# Patient Record
Sex: MALE | Race: WHITE | NOT HISPANIC OR LATINO | URBAN - METROPOLITAN AREA
[De-identification: names, ages, dates, MRNs, and addresses within clinical notes are randomized per-mention and may not be internally consistent; named-entity substitution may affect disease eponyms.]

---

## 2019-06-17 ENCOUNTER — INPATIENT (INPATIENT)
Facility: HOSPITAL | Age: 59
LOS: 0 days | Discharge: HOME | DRG: 303 | End: 2019-06-18
Attending: INTERNAL MEDICINE | Admitting: INTERNAL MEDICINE
Payer: COMMERCIAL

## 2019-06-17 VITALS
SYSTOLIC BLOOD PRESSURE: 119 MMHG | TEMPERATURE: 98 F | OXYGEN SATURATION: 97 % | HEART RATE: 85 BPM | RESPIRATION RATE: 18 BRPM | DIASTOLIC BLOOD PRESSURE: 66 MMHG | WEIGHT: 184.97 LBS

## 2019-06-17 DIAGNOSIS — Z87.81 PERSONAL HISTORY OF (HEALED) TRAUMATIC FRACTURE: Chronic | ICD-10-CM

## 2019-06-17 DIAGNOSIS — E78.00 PURE HYPERCHOLESTEROLEMIA, UNSPECIFIED: ICD-10-CM

## 2019-06-17 DIAGNOSIS — I20.0 UNSTABLE ANGINA: ICD-10-CM

## 2019-06-17 LAB
ALBUMIN SERPL ELPH-MCNC: 4.3 G/DL — SIGNIFICANT CHANGE UP (ref 3.3–5)
ALP SERPL-CCNC: 41 U/L — SIGNIFICANT CHANGE UP (ref 40–120)
ALT FLD-CCNC: 24 U/L — SIGNIFICANT CHANGE UP (ref 10–45)
ANION GAP SERPL CALC-SCNC: 9 MMOL/L — SIGNIFICANT CHANGE UP (ref 5–17)
AST SERPL-CCNC: 21 U/L — SIGNIFICANT CHANGE UP (ref 10–40)
BILIRUB SERPL-MCNC: 0.4 MG/DL — SIGNIFICANT CHANGE UP (ref 0.2–1.2)
BUN SERPL-MCNC: 19 MG/DL — SIGNIFICANT CHANGE UP (ref 7–23)
CALCIUM SERPL-MCNC: 9 MG/DL — SIGNIFICANT CHANGE UP (ref 8.4–10.5)
CHLORIDE SERPL-SCNC: 107 MMOL/L — SIGNIFICANT CHANGE UP (ref 96–108)
CO2 SERPL-SCNC: 24 MMOL/L — SIGNIFICANT CHANGE UP (ref 22–31)
CREAT SERPL-MCNC: 1 MG/DL — SIGNIFICANT CHANGE UP (ref 0.5–1.3)
GLUCOSE SERPL-MCNC: 106 MG/DL — HIGH (ref 70–99)
HCT VFR BLD CALC: 37.1 % — LOW (ref 39–50)
HGB BLD-MCNC: 12.5 G/DL — LOW (ref 13–17)
MCHC RBC-ENTMCNC: 30.8 PG — SIGNIFICANT CHANGE UP (ref 27–34)
MCHC RBC-ENTMCNC: 33.7 GM/DL — SIGNIFICANT CHANGE UP (ref 32–36)
MCV RBC AUTO: 91.4 FL — SIGNIFICANT CHANGE UP (ref 80–100)
NRBC # BLD: 0 /100 WBCS — SIGNIFICANT CHANGE UP (ref 0–0)
NT-PROBNP SERPL-SCNC: 221 PG/ML — SIGNIFICANT CHANGE UP (ref 0–300)
PLATELET # BLD AUTO: 241 K/UL — SIGNIFICANT CHANGE UP (ref 150–400)
POTASSIUM SERPL-MCNC: 4.3 MMOL/L — SIGNIFICANT CHANGE UP (ref 3.5–5.3)
POTASSIUM SERPL-SCNC: 4.3 MMOL/L — SIGNIFICANT CHANGE UP (ref 3.5–5.3)
PROT SERPL-MCNC: 7.1 G/DL — SIGNIFICANT CHANGE UP (ref 6–8.3)
RBC # BLD: 4.06 M/UL — LOW (ref 4.2–5.8)
RBC # FLD: 12.3 % — SIGNIFICANT CHANGE UP (ref 10.3–14.5)
SODIUM SERPL-SCNC: 140 MMOL/L — SIGNIFICANT CHANGE UP (ref 135–145)
TROPONIN T SERPL-MCNC: <0.01 NG/ML — SIGNIFICANT CHANGE UP (ref 0–0.01)
WBC # BLD: 7.17 K/UL — SIGNIFICANT CHANGE UP (ref 3.8–10.5)
WBC # FLD AUTO: 7.17 K/UL — SIGNIFICANT CHANGE UP (ref 3.8–10.5)

## 2019-06-17 PROCEDURE — 71046 X-RAY EXAM CHEST 2 VIEWS: CPT | Mod: 26

## 2019-06-17 PROCEDURE — 93010 ELECTROCARDIOGRAM REPORT: CPT

## 2019-06-17 PROCEDURE — 99285 EMERGENCY DEPT VISIT HI MDM: CPT | Mod: 25

## 2019-06-17 RX ORDER — ASPIRIN/CALCIUM CARB/MAGNESIUM 324 MG
1 TABLET ORAL
Qty: 0 | Refills: 0 | DISCHARGE

## 2019-06-17 RX ORDER — ATORVASTATIN CALCIUM 80 MG/1
40 TABLET, FILM COATED ORAL AT BEDTIME
Refills: 0 | Status: DISCONTINUED | OUTPATIENT
Start: 2019-06-17 | End: 2019-06-18

## 2019-06-17 RX ORDER — ROSUVASTATIN CALCIUM 5 MG/1
1 TABLET ORAL
Qty: 0 | Refills: 0 | DISCHARGE

## 2019-06-17 RX ORDER — ASPIRIN/CALCIUM CARB/MAGNESIUM 324 MG
325 TABLET ORAL ONCE
Refills: 0 | Status: COMPLETED | OUTPATIENT
Start: 2019-06-17 | End: 2019-06-17

## 2019-06-17 RX ORDER — ASPIRIN/CALCIUM CARB/MAGNESIUM 324 MG
81 TABLET ORAL DAILY
Refills: 0 | Status: DISCONTINUED | OUTPATIENT
Start: 2019-06-18 | End: 2019-06-18

## 2019-06-17 RX ORDER — ONDANSETRON 8 MG/1
4 TABLET, FILM COATED ORAL ONCE
Refills: 0 | Status: COMPLETED | OUTPATIENT
Start: 2019-06-17 | End: 2019-06-17

## 2019-06-17 RX ADMIN — ATORVASTATIN CALCIUM 40 MILLIGRAM(S): 80 TABLET, FILM COATED ORAL at 22:43

## 2019-06-17 RX ADMIN — ONDANSETRON 4 MILLIGRAM(S): 8 TABLET, FILM COATED ORAL at 19:43

## 2019-06-17 RX ADMIN — Medication 325 MILLIGRAM(S): at 19:43

## 2019-06-17 NOTE — ED PROVIDER NOTE - OBJECTIVE STATEMENT
59 y/o M w/hx ventricular ectopy (known, being watched by EP), CAD w/known 30% LAD lesion (no intervention thus far), referred to ED by Cardiologist Dr. Kelly Ramírez after a stress test today, which was unremarkable, but immediately afterward pt developed nausea lasting several hours. No vomiting. No CP or palpitations. States he was SOB toward the end of his treadmill stress test, but subsided with rest. No LE pain/swelling. No fever/chills/cough or recent illness. Referred for ACS r/o, concerned for nausea as anginal equivalent.

## 2019-06-17 NOTE — H&P ADULT - NSHPREVIEWOFSYSTEMS_GEN_ALL_CORE
GENERAL, CONSTITUTIONAL : denies recent weight loss, fever, chills  EYES, VISION: denies changes in vision   EARS, NOSE, THROAT: denies hearing loss  HEART, CARDIOVASCULAR: + chest pain,  SOB; denies arrhythmia, palpitations,  LE edema, claudication  RESPIRATORY: Denies cough, wheezing, PND, orthopnea  GASTROINTESTINAL: + SOB,; Denies abdominal pain, heartburn, bloody stool, dark tarry stool  GENITOURINARY: Denies frequent urination, urgency  MUSCULOSKELETAL denies joint pain or swelling, restricted motion, musculoskeletal pain.   SKIN & INTEGUMENTARY Denies rashes, sores, blisters, blisters, growths.  NEUROLOGICAL: Denies numbness or tingling sensations, sensation loss, burning.   PSYCHIATRIC: Denies nervousness, anxiety, depression  ENDOCRINE Denies heat or cold intolerance, excessive thirst  HEMATOLOGIC/LYMPHATIC: Denies abnormal bleeding, bleeding of any kind

## 2019-06-17 NOTE — ED PROVIDER NOTE - PHYSICAL EXAMINATION
VITAL SIGNS: I have reviewed nursing notes and confirm.  CONSTITUTIONAL: Well-developed; well-nourished; in no acute distress.  SKIN: Skin exam is warm and dry, no acute rash.  HEAD: Normocephalic; atraumatic.  EYES: PERRL, EOM intact; conjunctiva and sclera clear.  ENT: No nasal discharge; airway clear.  NECK: Supple; non tender.  CARD: S1, S2 normal; no murmurs, gallops, or rubs. Regular rate and rhythm.  RESP: No wheezes, rales or rhonchi.  ABD: Normal bowel sounds; soft; non-distended; non-tender; no hepatosplenomegaly.  EXT: Normal ROM. No clubbing, cyanosis or edema.  LYMPH: No acute cervical adenopathy.  NEURO: Alert, oriented. Grossly unremarkable.  PSYCH: Cooperative, appropriate.

## 2019-06-17 NOTE — ED ADULT NURSE NOTE - CHPI ED NUR SYMPTOMS NEG
no shortness of breath/no syncope/no chills/no chest pain/no vomiting/no congestion/no diaphoresis/no back pain/no fever

## 2019-06-17 NOTE — ED ADULT NURSE NOTE - NSIMPLEMENTINTERV_GEN_ALL_ED
Implemented All Universal Safety Interventions:  [place] to call system. Call bell, personal items and telephone within reach. Instruct patient to call for assistance. Room bathroom lighting operational. Non-slip footwear when patient is off stretcher. Physically safe environment: no spills, clutter or unnecessary equipment. Stretcher in lowest position, wheels locked, appropriate side rails in place.

## 2019-06-17 NOTE — PATIENT PROFILE ADULT - STATED REASON FOR ADMISSION
I had a stress test at my cardiologists's office, and after it was completed I felt short of breath and nauseous, had some minor chest pains

## 2019-06-17 NOTE — ED ADULT NURSE NOTE - OBJECTIVE STATEMENT
Pt reports going for a routine stress test today, started to feel lightheaded and nauseous after 10 min. Pt reports "a little nausea" at this time, denies chest pain, sob, weakness.

## 2019-06-17 NOTE — H&P ADULT - NSHPPHYSICALEXAM_GEN_ALL_CORE
Temp 98.6F, HR 70bpm, /80, RR 16, O2 sat 98% RA, Weight 200lbs, Height 5' 6"    T(C): 36.9 (06-17-19 @ 22:26), Max: 36.9 (06-17-19 @ 22:26)  HR: 74 (06-17-19 @ 20:48) (74 - 85)  BP: 126/85 (06-17-19 @ 20:48) (119/66 - 126/85)  RR: 16 (06-17-19 @ 20:48) (16 - 18)  SpO2: 99% (06-17-19 @ 20:48) (97% - 99%)  Wt(kg): --    Appearance: Normal	  HEENT:   Normal oral mucosa, PERRL, EOMI	  Neck: Supple, - JVD; No Carotid Bruit and 2+ pulses B/L  Cardiovascular: Normal S1 S2, No JVD, No murmurs  Respiratory: Lungs clear to auscultation/Decreased Breath Sounds/No Rales, Rhonchi, Wheezing	  Gastrointestinal:  Soft, Non-tender, + BS	  Skin: No rashes, No ecchymoses, No cyanosis  Extremities: Normal range of motion, No clubbing, cyanosis or edema  Vascular: Femoral pulses 2+ b/l without bruit, DP 1+ b/l, PT 1+ b/l  Neurologic: Non-focal  Psychiatry: A & O x 3, Mood & affect appropriate

## 2019-06-17 NOTE — H&P ADULT - NSHPLABSRESULTS_GEN_ALL_CORE
12.5   7.17  )-----------( 241      ( 17 Jun 2019 19:45 )             37.1       06-17    140  |  107  |  19  ----------------------------<  106<H>  4.3   |  24  |  1.00    Ca    9.0      17 Jun 2019 19:45    TPro  7.1  /  Alb  4.3  /  TBili  0.4  /  DBili  x   /  AST  21  /  ALT  24  /  AlkPhos  41  06-17          CARDIAC MARKERS ( 17 Jun 2019 19:45 )  x     / <0.01 ng/mL / x     / x     / x

## 2019-06-17 NOTE — ED PROVIDER NOTE - CLINICAL SUMMARY MEDICAL DECISION MAKING FREE TEXT BOX
+ Chest pain and nausea following stress test with known LAD lesion, first trop negative, HDS and comfortable in ED, admitting to cardiology for trending of trop and Cardiac CTA in the morning. d/w Dr. Ramírez.

## 2019-06-17 NOTE — H&P ADULT - HISTORY OF PRESENT ILLNESS
This is a 57y/o male with HPL, known ventricular ectopy--followed by EP, CAD w/known 30% LAD lesion by CTA coronaries 2 years ago, who is sent to the ER by cardiologist for evaluation of chest pain  Patient had a routine stress test today, which was unremarkable, but immediately afterward pt developed nausea lasting several hours, along with left sided chest pain and lightheadedness. Pain is on/off and is described as sharp   In the ER, 1st troponin was negative, no EKG change. He received aspirin 325mg and Zofran 4mg IV x1 and admitted for further management

## 2019-06-18 ENCOUNTER — TRANSCRIPTION ENCOUNTER (OUTPATIENT)
Age: 59
End: 2019-06-18

## 2019-06-18 VITALS — TEMPERATURE: 98 F

## 2019-06-18 LAB
ANION GAP SERPL CALC-SCNC: 10 MMOL/L — SIGNIFICANT CHANGE UP (ref 5–17)
BUN SERPL-MCNC: 15 MG/DL — SIGNIFICANT CHANGE UP (ref 7–23)
CALCIUM SERPL-MCNC: 8.7 MG/DL — SIGNIFICANT CHANGE UP (ref 8.4–10.5)
CHLORIDE SERPL-SCNC: 105 MMOL/L — SIGNIFICANT CHANGE UP (ref 96–108)
CO2 SERPL-SCNC: 24 MMOL/L — SIGNIFICANT CHANGE UP (ref 22–31)
CREAT SERPL-MCNC: 0.93 MG/DL — SIGNIFICANT CHANGE UP (ref 0.5–1.3)
GLUCOSE SERPL-MCNC: 100 MG/DL — HIGH (ref 70–99)
HCT VFR BLD CALC: 37 % — LOW (ref 39–50)
HCV AB S/CO SERPL IA: 0.08 S/CO — SIGNIFICANT CHANGE UP
HCV AB SERPL-IMP: SIGNIFICANT CHANGE UP
HGB BLD-MCNC: 12.4 G/DL — LOW (ref 13–17)
MAGNESIUM SERPL-MCNC: 2.2 MG/DL — SIGNIFICANT CHANGE UP (ref 1.6–2.6)
MCHC RBC-ENTMCNC: 30.5 PG — SIGNIFICANT CHANGE UP (ref 27–34)
MCHC RBC-ENTMCNC: 33.5 GM/DL — SIGNIFICANT CHANGE UP (ref 32–36)
MCV RBC AUTO: 91.1 FL — SIGNIFICANT CHANGE UP (ref 80–100)
NRBC # BLD: 0 /100 WBCS — SIGNIFICANT CHANGE UP (ref 0–0)
PLATELET # BLD AUTO: 245 K/UL — SIGNIFICANT CHANGE UP (ref 150–400)
POTASSIUM SERPL-MCNC: 3.7 MMOL/L — SIGNIFICANT CHANGE UP (ref 3.5–5.3)
POTASSIUM SERPL-SCNC: 3.7 MMOL/L — SIGNIFICANT CHANGE UP (ref 3.5–5.3)
RBC # BLD: 4.06 M/UL — LOW (ref 4.2–5.8)
RBC # FLD: 12.4 % — SIGNIFICANT CHANGE UP (ref 10.3–14.5)
SODIUM SERPL-SCNC: 139 MMOL/L — SIGNIFICANT CHANGE UP (ref 135–145)
TROPONIN T SERPL-MCNC: <0.01 NG/ML — SIGNIFICANT CHANGE UP (ref 0–0.01)
TROPONIN T SERPL-MCNC: <0.01 NG/ML — SIGNIFICANT CHANGE UP (ref 0–0.01)
WBC # BLD: 7.56 K/UL — SIGNIFICANT CHANGE UP (ref 3.8–10.5)
WBC # FLD AUTO: 7.56 K/UL — SIGNIFICANT CHANGE UP (ref 3.8–10.5)

## 2019-06-18 PROCEDURE — 80053 COMPREHEN METABOLIC PANEL: CPT

## 2019-06-18 PROCEDURE — 80048 BASIC METABOLIC PNL TOTAL CA: CPT

## 2019-06-18 PROCEDURE — 93005 ELECTROCARDIOGRAM TRACING: CPT

## 2019-06-18 PROCEDURE — 36415 COLL VENOUS BLD VENIPUNCTURE: CPT

## 2019-06-18 PROCEDURE — 75574 CT ANGIO HRT W/3D IMAGE: CPT | Mod: 26

## 2019-06-18 PROCEDURE — 86803 HEPATITIS C AB TEST: CPT

## 2019-06-18 PROCEDURE — 71046 X-RAY EXAM CHEST 2 VIEWS: CPT

## 2019-06-18 PROCEDURE — 93306 TTE W/DOPPLER COMPLETE: CPT

## 2019-06-18 PROCEDURE — 75574 CT ANGIO HRT W/3D IMAGE: CPT

## 2019-06-18 PROCEDURE — 83880 ASSAY OF NATRIURETIC PEPTIDE: CPT

## 2019-06-18 PROCEDURE — 85027 COMPLETE CBC AUTOMATED: CPT

## 2019-06-18 PROCEDURE — 96374 THER/PROPH/DIAG INJ IV PUSH: CPT

## 2019-06-18 PROCEDURE — 99223 1ST HOSP IP/OBS HIGH 75: CPT

## 2019-06-18 PROCEDURE — 93306 TTE W/DOPPLER COMPLETE: CPT | Mod: 26

## 2019-06-18 PROCEDURE — 83735 ASSAY OF MAGNESIUM: CPT

## 2019-06-18 PROCEDURE — 99285 EMERGENCY DEPT VISIT HI MDM: CPT | Mod: 25

## 2019-06-18 PROCEDURE — G0378: CPT

## 2019-06-18 PROCEDURE — 84484 ASSAY OF TROPONIN QUANT: CPT

## 2019-06-18 RX ADMIN — Medication 81 MILLIGRAM(S): at 15:35

## 2019-06-18 NOTE — DISCHARGE NOTE PROVIDER - HOSPITAL COURSE
59y/o male with HPL, known ventricular ectopy--followed by EP, and CAD w/known 30% LAD lesion by CTA coronaries 2 years ago, who is sent to the ER by cardiologist for evaluation of chest pain    Patient had a routine stress test today, which was unremarkable, but immediately afterward pt developed nausea lasting several hours, along with left sided chest pain and lightheadedness. Pain is on/off and is described as sharp. In the ER, 1st troponin was negative, no EKG change. He received aspirin 325mg and Zofran 4mg IV x1 and admitted for further management.    Pt had an echo revealing EF 50-55% without WMA and a CCTA which revealed Calcium score of 5, Mild mid LAD stenosis, all other arteries non obstructive. Pt seen and examined at bedside this AM without any compliants, states that CP has improved since admission. VSS, labs and telemetry reviewed and pt stable for discharge as discussed with Dr. Ramírez. Pt has been given appropriate discharge instructions, including medication regimen and follow up with Dr. Ramírez and Dr. Boogie.        PHYSICAL EXAM:    resp- lungs CTA B/L, no wheezes/rhonchi/rales    card- RRR no murmurs/rubs/gallops    abd- soft non distended non tender BS+    ext- no cyanosis or edema 57y/o male with HPL, known ventricular ectopy--followed by EP, and CAD w/known 30% LAD lesion by CTA coronaries 2 years ago, who is sent to the ER by cardiologist for evaluation of chest pain    Patient had a routine stress test today, which was unremarkable, but immediately afterward pt developed nausea lasting several hours, along with left sided chest pain and lightheadedness. Pain is on/off and is described as sharp. In the ER, 1st troponin was negative, no EKG change. He received aspirin 325mg and Zofran 4mg IV x1 and admitted for further management.    Pt had an echo revealing EF 50-55% without WMA and a CCTA which revealed Calcium score of 5, Mild mid LAD stenosis, all other arteries non obstructive. Pt seen and examined at bedside this AM without any compliants, states that CP has improved since admission. VSS, labs and telemetry reviewed and pt stable for discharge as discussed with Dr. Ramírez. Pt has been given appropriate discharge instructions, including medication regimen and follow up with Dr. Ramírez in 1-2 weeks and Dr. Boogie on 8/13/19 at 1020AM.        PHYSICAL EXAM:    resp- lungs CTA B/L, no wheezes/rhonchi/rales    card- RRR no murmurs/rubs/gallops    abd- soft non distended non tender BS+    ext- no cyanosis or edema

## 2019-06-18 NOTE — DISCHARGE NOTE PROVIDER - CARE PROVIDERS DIRECT ADDRESSES
,[email or web address]

## 2019-06-18 NOTE — DISCHARGE NOTE PROVIDER - NSDCCPCAREPLAN_GEN_ALL_CORE_FT
PRINCIPAL DISCHARGE DIAGNOSIS  Diagnosis: Chest pain  Assessment and Plan of Treatment: You came to the hospital having chest pain, also known as Angina. You had cardiac enzymes drawn which was negative for any damage to the heart muscle, or a heart attack. You underwent a CT of the heart and an Echocardiogram (ultrasound of the heart) which were both normal. Please follow up with Dr. Ramírez in 1-2 weeks. If you experience any persistent chest pain, palpitations, shortness of breath, lightheadedness or dizziness, please go to the nearest emergency room.      SECONDARY DISCHARGE DIAGNOSES  Diagnosis: HLD (hyperlipidemia)  Assessment and Plan of Treatment: Please continue your Atorvastatin 40mg daily to keep your cholesterol low. High cholesterol contributes to heart disease. PRINCIPAL DISCHARGE DIAGNOSIS  Diagnosis: Chest pain  Assessment and Plan of Treatment: You came to the hospital having chest pain, also known as Angina. You had cardiac enzymes drawn which was negative for any damage to the heart muscle, or a heart attack. You underwent a CT of the heart and an Echocardiogram (ultrasound of the heart) which were both normal. Please follow up with Dr. Ramírez in 1-2 weeks. Dr. Boogie would also like to follow up with you on 8/13/19 at 1020AM for further workup. If you experience any persistent chest pain, palpitations, shortness of breath, lightheadedness or dizziness, please go to the nearest emergency room.      SECONDARY DISCHARGE DIAGNOSES  Diagnosis: HLD (hyperlipidemia)  Assessment and Plan of Treatment: Please continue your Atorvastatin 40mg daily to keep your cholesterol low. High cholesterol contributes to heart disease.

## 2019-06-18 NOTE — DISCHARGE NOTE NURSING/CASE MANAGEMENT/SOCIAL WORK - NSDCDPATPORTLINK_GEN_ALL_CORE
You can access the [facility], offered by [facility], by registering with the following website: [email or web address]

## 2019-06-18 NOTE — DISCHARGE NOTE PROVIDER - PROVIDER TOKENS
PROVIDER:[TOKEN:[[id number]],FOLLOWUP:[2 weeks]] PROVIDER:[TOKEN:[[id number]],FOLLOWUP:[2 weeks]],PROVIDER:[TOKEN:[[id number]]]

## 2019-06-18 NOTE — DISCHARGE NOTE PROVIDER - CARE PROVIDER_API CALL
Kelly Ramírez)  Cardiovascular Disease; Internal Medicine  [address] [place] [address]  Phone: [phone]  Fax: [phone]  Follow Up Time: 2 weeks Kelly Ramírez)  Cardiovascular Disease; Internal Medicine  [facility] [place] [address]  Phone: [phone]  Fax: [phone]  Follow Up Time: 2 weeks    Krishna Boogie)  Cardiac Electrophysiology; Cardiovascular Disease  [address] [place] [address]  Phone: [phone]  Fax: [phone]  Follow Up Time:

## 2019-06-18 NOTE — PROGRESS NOTE ADULT - SUBJECTIVE AND OBJECTIVE BOX
HPI:  This is a 59y/o male with HPL, known ventricular ectopy--followed by EP, CAD w/known 30% LAD lesion by CTA coronaries 2 years ago, who is sent to the ER by cardiologist for evaluation of chest pain  Patient had a routine stress test today, which was unremarkable, but immediately afterward pt developed nausea lasting several hours, along with left sided chest pain and lightheadedness. Pain is on/off and is described as sharp   In the ER, 1st troponin was negative, no EKG change. He received aspirin 325mg and Zofran 4mg IV x1 and admitted for further management (17 Jun 2019 22:17)    troponin x 3 negative no furthur nausea mild discomfort    MEDICATIONS  (STANDING):  aspirin enteric coated 81 milliGRAM(s) Oral daily  atorvastatin 40 milliGRAM(s) Oral at bedtime        PAST MEDICAL & SURGICAL HISTORY:  Glaucoma  Ventricular ectopy  Hypercholesterolemia  S/p left hip fracture        REVIEW OF SYSTEMS      General:	    Skin/Breast:  	  Ophthalmologic:  	  ENMT:	    Respiratory and Thorax:  	  Cardiovascular:	cp    Gastrointestinal:	nausea    Genitourinary:	    Musculoskeletal:	    Neurological:	    Psychiatric:	    Hematology/Lymphatics:	    Endocrine:	    Allergic/Immunologic:	    FAMILY HISTORY:      Allergies    No Known Allergies    Intolerances          Vital Signs Last 24 Hrs  T(C): 36.2 (18 Jun 2019 09:06), Max: 36.9 (17 Jun 2019 22:26)  T(F): 97.1 (18 Jun 2019 09:06), Max: 98.4 (17 Jun 2019 22:26)  HR: 70 (18 Jun 2019 05:45) (62 - 85)  BP: 105/56 (18 Jun 2019 05:45) (105/56 - 128/70)  BP(mean): --  RR: 17 (18 Jun 2019 05:45) (15 - 18)  SpO2: 94% (18 Jun 2019 05:45) (94% - 99%)      PHYSICAL EXAM:      Constitutional:    Eyes:    ENMT:    Neck:    Breasts:    Back:    Respiratory:clear    Cardiovascular:s1 s2     Gastrointestinal:    Genitourinary:    Rectal:    Extremities:    Vascular:    Neurological:    Skin:    Lymph Nodes:    Musculoskeletal:    Psychiatric:          CARDIAC MARKERS ( 18 Jun 2019 06:09 )  x     / <0.01 ng/mL / x     / x     / x      CARDIAC MARKERS ( 17 Jun 2019 23:13 )  x     / <0.01 ng/mL / x     / x     / x      CARDIAC MARKERS ( 17 Jun 2019 19:45 )  x     / <0.01 ng/mL / x     / x     / x                    Vital Signs Last 24 Hrs  T(C): 36.2 (18 Jun 2019 09:06), Max: 36.9 (17 Jun 2019 22:26)  T(F): 97.1 (18 Jun 2019 09:06), Max: 98.4 (17 Jun 2019 22:26)  HR: 70 (18 Jun 2019 05:45) (62 - 85)  BP: 105/56 (18 Jun 2019 05:45) (105/56 - 128/70)  BP(mean): --  RR: 17 (18 Jun 2019 05:45) (15 - 18)  SpO2: 94% (18 Jun 2019 05:45) (94% - 99%)    HEALTH ISSUES - PROBLEM Dx:  Hypercholesterolemia: Hypercholesterolemia  Unstable angina: Unstable angina troponin neagative x3 cta and echocardiogram this am

## 2019-06-18 NOTE — CONSULT NOTE ADULT - ASSESSMENT
59y/o male with HPL, known frequent PVC (reportedly 30% PVC burden in past outpatient telemetry monitor), non-obstructive CAD by CTA coronaries.  Echo with normal LVEF 50-55%.  Telemetry with ventricular bigeminy.   - Pt with non-perfusing PVC with effective HR in the mid 40s when he is having ventricular bigeminy.  Pt is asymptomatic from these PVC.  We went over options including no treatment, medications and ablation. For now, he can follow up with Dr. Boogie on 8/13/19 at 10:20 AM.

## 2019-06-18 NOTE — CONSULT NOTE ADULT - SUBJECTIVE AND OBJECTIVE BOX
Electrophysiology Consult Note:     CHIEF COMPLAINT:  Patient is a 58y old  Male who presents with a chief complaint of Chest pain, nausea (2019 15:47)        HISTORY OF PRESENT ILLNESS:   59y/o male with HPL, known PVC (reportedly 30% PVC burden in past outpatient telemetry monitor - seen by Dr. Burrows in the past),   non-obstructive CAD by CTA coronaries 2 years ago, who is sent to the ER by Dr. Ramírez for evaluation of chest pain.  He underwent CTA coronary today that showed mild mid LAD stenosis but the rest of arteries are normal.  Echo with normal LVEF 50-55%.    EPS is called to see him for the frequent PVC.  He states that he is not aware of symptoms from it. Denies syncope or palpitations.  States that he has normal Activity of daily living.  Never been on a beta-blocker in the past.         PAST MEDICAL & SURGICAL HISTORY:  Glaucoma  Ventricular ectopy  Hypercholesterolemia  S/p left hip fracture      FAMILY HISTORY:   Father -- had h/o CAD  Mother --  of CHF  Brother -- had CABG       SOCIAL HISTORY:    Smoked tobacco at age 18 for a few months  Social ETOH  Denies drugs        Allergies:  No Known Allergies      MEDICATIONS  (STANDING):  aspirin enteric coated 81 milliGRAM(s) Oral daily  atorvastatin 40 milliGRAM(s) Oral at bedtime      REVIEW OF SYSTEMS:  CONSTITUTIONAL: No fever, weight loss, or fatigue  EYES: No eye pain, visual disturbances, or discharge  ENMT:  No difficulty hearing, tinnitus, vertigo; No sinus or throat pain  NECK: No pain or stiffness  BREASTS: No pain, masses, or nipple discharge  RESPIRATORY: No cough, wheezing, chills or hemoptysis; No Shortness of Breath  CARDIOVASCULAR: No chest pain, palpitations, dizziness, or leg swelling  GASTROINTESTINAL: No abdominal or epigastric pain. No nausea, vomiting, or hematemesis; No diarrhea or constipation. No melena or hematochezia.  GENITOURINARY: No dysuria, frequency, hematuria, or incontinence  NEUROLOGICAL: No headaches, memory loss, loss of strength, numbness, or tremors  SKIN: No itching, burning, rashes, or lesions   LYMPH Nodes: No enlarged glands  ENDOCRINE: No heat or cold intolerance; No hair loss  MUSCULOSKELETAL: No joint pain or swelling; No muscle, back, or extremity pain  PSYCHIATRIC: No depression, anxiety, mood swings, or difficulty sleeping  HEME/LYMPH: No easy bruising, or bleeding gums  ALLERY AND IMMUNOLOGIC: No hives or eczema	      PHYSICAL EXAM:  Vital Signs Last 24 Hrs  T(C): 36.4 (2019 15:26), Max: 36.9 (2019 22:26)  T(F): 97.5 (2019 15:26), Max: 98.4 (2019 22:26)  HR: 70 (2019 09:53) (62 - 85)  BP: 112/57 (2019 09:53) (105/56 - 128/70)  RR: 17 (2019 05:45) (15 - 18)  SpO2: 94% (2019 05:45) (94% - 99%)  Daily Height in cm: 182.88 (2019 05:45)      Constitutional: NAD	  HEENT:   Normal oral mucosa, PERRL, EOMI	  Neck: No JVD  CVS: Normal S1 / S2, tim (pulse check), No murmurs  Pulm: CTA. No wheeze or rale  GI:  + BS, soft, NT / ND   Ext: No LE edema  Vascular: Peripheral pulses palpable 2+ bilaterally  MS: full range of motion in all joints  Neurologic: A&O x 3, Non-focal  Psych: Pleasant, has good insight  Skin: No rash or lesion       	  LABS:	                         12.4   7.56  )-----------( 245      ( 2019 06:09 )             37.0         139  |  105  |  15  ----------------------------<  100<H>  3.7   |  24  |  0.93    Ca    8.7      2019 06:09  Mg     2.2         TPro  7.1  /  Alb  4.3  /  TBili  0.4  /  DBili  x   /  AST  21  /  ALT  24  /  AlkPhos  41      proBNP: Serum Pro-Brain Natriuretic Peptide: 221 pg/mL ( @ 19:45)      EK19: NSR with ventricular trigeminy. PVC with superior axis. PVC mostly negative in precordial leads.   Telemetry: NSR with frequent PVC (bigeminy).     Echocardiogram (19 @ 14:05)   Very technically difficult study. Frequent ectopy seen. Normal left   ventricular size and wall thickness. Frequent ectopy precludes accurate LVEF   determination. However, the left ventricular systolic function is   probably normal. The left ventricular ejection fraction is 50-55%.  The right   ventricle is normal in size and function.The left atrial size is normal.Right   atrial size is normal.No evidence for any hemodynamically significant valvular   disease.There was insufficient TR detected from which to calculate   pulmonary artery systolic pressure.  There is no pericardial effusion.          CT Angio Cardiac w/ IV Cont (19 @ 12:46)   1.  The calcium score is minimal at 5  Agatston units, which is at the 40th percentile, adjusted for age, gender and race.  2.  LM, proximal RCA and mid Lcx are probably normal(motion artifact present)  3.  Mild mid LAD stenosis  4.  The rest of the coronary segments appear normal.  Please see separate radiology report for non-coronary findings.

## 2019-06-21 DIAGNOSIS — I49.3 VENTRICULAR PREMATURE DEPOLARIZATION: ICD-10-CM

## 2019-06-21 DIAGNOSIS — Z87.81 PERSONAL HISTORY OF (HEALED) TRAUMATIC FRACTURE: ICD-10-CM

## 2019-06-21 DIAGNOSIS — I51.9 HEART DISEASE, UNSPECIFIED: ICD-10-CM

## 2019-06-21 DIAGNOSIS — I25.110 ATHEROSCLEROTIC HEART DISEASE OF NATIVE CORONARY ARTERY WITH UNSTABLE ANGINA PECTORIS: ICD-10-CM

## 2019-06-21 DIAGNOSIS — H40.9 UNSPECIFIED GLAUCOMA: ICD-10-CM

## 2019-06-21 DIAGNOSIS — E78.5 HYPERLIPIDEMIA, UNSPECIFIED: ICD-10-CM

## 2019-08-13 ENCOUNTER — NON-APPOINTMENT (OUTPATIENT)
Age: 59
End: 2019-08-13

## 2019-08-13 ENCOUNTER — APPOINTMENT (OUTPATIENT)
Dept: HEART AND VASCULAR | Facility: CLINIC | Age: 59
End: 2019-08-13
Payer: COMMERCIAL

## 2019-08-13 VITALS
HEART RATE: 77 BPM | SYSTOLIC BLOOD PRESSURE: 139 MMHG | DIASTOLIC BLOOD PRESSURE: 70 MMHG | HEIGHT: 72 IN | BODY MASS INDEX: 24.79 KG/M2 | WEIGHT: 183 LBS

## 2019-08-13 DIAGNOSIS — Z87.891 PERSONAL HISTORY OF NICOTINE DEPENDENCE: ICD-10-CM

## 2019-08-13 DIAGNOSIS — Z82.49 FAMILY HISTORY OF ISCHEMIC HEART DISEASE AND OTHER DISEASES OF THE CIRCULATORY SYSTEM: ICD-10-CM

## 2019-08-13 PROCEDURE — 93000 ELECTROCARDIOGRAM COMPLETE: CPT

## 2019-08-13 PROCEDURE — 99215 OFFICE O/P EST HI 40 MIN: CPT | Mod: 25

## 2019-08-22 NOTE — HISTORY OF PRESENT ILLNESS
[FreeTextEntry1] : 58 y/o M with h/o HLD, known frequent PVC (reportedly 30% PVC burden in past outpatient telemetry monitor), non-obstructive CAD by CTA coronaries.  Echo with normal LVEF 50-55%.  Telemetry with ventricular bigeminy. \par \par He does admit to two episodes of fainting in his lifetime in the setting of severe pain - once with a hip fracture and once with severe back pain.  Otherwise he feels well and denies any awareness of rapid/irregular heart action, SOB/MANCINI, dizziness.  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

## 2019-08-22 NOTE — PHYSICAL EXAM
[General Appearance - Well Developed] : well developed [Normal Appearance] : normal appearance [Well Groomed] : well groomed [General Appearance - Well Nourished] : well nourished [No Deformities] : no deformities [General Appearance - In No Acute Distress] : no acute distress [Normal Conjunctiva] : the conjunctiva exhibited no abnormalities [Eyelids - No Xanthelasma] : the eyelids demonstrated no xanthelasmas [Normal Oral Mucosa] : normal oral mucosa [No Oral Pallor] : no oral pallor [No Oral Cyanosis] : no oral cyanosis [Normal Jugular Venous A Waves Present] : normal jugular venous A waves present [Normal Jugular Venous V Waves Present] : normal jugular venous V waves present [No Jugular Venous Paez A Waves] : no jugular venous paez A waves [5th Left ICS - MCL] : palpated at the 5th LICS in the midclavicular line [Normal] : normal [Normal Rate] : normal [Premature Beats] : regular with premature beats [No Murmur] : no murmurs heard [No Pitting Edema] : no pitting edema present [Exaggerated Use Of Accessory Muscles For Inspiration] : no accessory muscle use [Respiration, Rhythm And Depth] : normal respiratory rhythm and effort [Auscultation Breath Sounds / Voice Sounds] : lungs were clear to auscultation bilaterally [Abdomen Tenderness] : non-tender [Abdomen Soft] : soft [Abdomen Mass (___ Cm)] : no abdominal mass palpated [Abnormal Walk] : normal gait [Gait - Sufficient For Exercise Testing] : the gait was sufficient for exercise testing [Nail Clubbing] : no clubbing of the fingernails [Cyanosis, Localized] : no localized cyanosis [Petechial Hemorrhages (___cm)] : no petechial hemorrhages [Skin Color & Pigmentation] : normal skin color and pigmentation [] : no rash [No Venous Stasis] : no venous stasis [Skin Lesions] : no skin lesions [No Skin Ulcers] : no skin ulcer [No Xanthoma] : no  xanthoma was observed [Oriented To Time, Place, And Person] : oriented to person, place, and time [Affect] : the affect was normal [Mood] : the mood was normal [No Anxiety] : not feeling anxious

## 2019-10-10 PROBLEM — E78.00 PURE HYPERCHOLESTEROLEMIA, UNSPECIFIED: Chronic | Status: ACTIVE | Noted: 2019-06-17

## 2019-10-10 PROBLEM — I49.3 VENTRICULAR PREMATURE DEPOLARIZATION: Chronic | Status: ACTIVE | Noted: 2019-06-17

## 2019-10-10 PROBLEM — H40.9 UNSPECIFIED GLAUCOMA: Chronic | Status: ACTIVE | Noted: 2019-06-17

## 2019-11-22 ENCOUNTER — APPOINTMENT (OUTPATIENT)
Dept: HEART AND VASCULAR | Facility: CLINIC | Age: 59
End: 2019-11-22

## 2021-09-15 NOTE — PATIENT PROFILE ADULT - TOBACCO USE
Gabriela Thompson returning missed call from the office. Patient just got out of [facility] yesterday. The best time to reach him is Anytime. Thank you. Never smoker

## 2022-01-18 ENCOUNTER — NON-APPOINTMENT (OUTPATIENT)
Age: 62
End: 2022-01-18

## 2022-01-18 ENCOUNTER — APPOINTMENT (OUTPATIENT)
Dept: HEART AND VASCULAR | Facility: CLINIC | Age: 62
End: 2022-01-18
Payer: COMMERCIAL

## 2022-01-18 VITALS
TEMPERATURE: 96.5 F | WEIGHT: 198 LBS | DIASTOLIC BLOOD PRESSURE: 71 MMHG | SYSTOLIC BLOOD PRESSURE: 107 MMHG | HEART RATE: 70 BPM | HEIGHT: 72 IN | BODY MASS INDEX: 26.82 KG/M2

## 2022-01-18 DIAGNOSIS — Z00.00 ENCOUNTER FOR GENERAL ADULT MEDICAL EXAMINATION W/OUT ABNORMAL FINDINGS: ICD-10-CM

## 2022-01-18 PROCEDURE — 99214 OFFICE O/P EST MOD 30 MIN: CPT | Mod: 25

## 2022-01-18 PROCEDURE — 93015 CV STRESS TEST SUPVJ I&R: CPT

## 2022-01-18 PROCEDURE — ZZZZZ: CPT

## 2022-01-18 PROCEDURE — 93306 TTE W/DOPPLER COMPLETE: CPT

## 2022-01-19 NOTE — ASSESSMENT
[FreeTextEntry1] : pt pvcs suppress with exercise no ischemic changes holter placed minimal cad check lipid panel holter p[laced

## 2022-01-19 NOTE — HISTORY OF PRESENT ILLNESS
[FreeTextEntry1] : pt with hx of 30% pvcs on holter pt wants to start rigourous exercise program on cta 2019 minimal cad calcium score of 5

## 2022-01-21 LAB
ALBUMIN SERPL ELPH-MCNC: 4.4 G/DL
ALP BLD-CCNC: 44 U/L
ALT SERPL-CCNC: 27 U/L
ANION GAP SERPL CALC-SCNC: 12 MMOL/L
AST SERPL-CCNC: 19 U/L
BASOPHILS # BLD AUTO: 0.06 K/UL
BASOPHILS NFR BLD AUTO: 0.8 %
BILIRUB SERPL-MCNC: 0.4 MG/DL
BUN SERPL-MCNC: 20 MG/DL
CALCIUM SERPL-MCNC: 9.3 MG/DL
CHLORIDE SERPL-SCNC: 102 MMOL/L
CHOLEST SERPL-MCNC: 149 MG/DL
CK SERPL-CCNC: 94 U/L
CO2 SERPL-SCNC: 24 MMOL/L
CREAT SERPL-MCNC: 1.03 MG/DL
EOSINOPHIL # BLD AUTO: 0.43 K/UL
EOSINOPHIL NFR BLD AUTO: 6 %
ESTIMATED AVERAGE GLUCOSE: 111 MG/DL
FERRITIN SERPL-MCNC: 137 NG/ML
GLUCOSE SERPL-MCNC: 92 MG/DL
HBA1C MFR BLD HPLC: 5.5 %
HCT VFR BLD CALC: 39.1 %
HDLC SERPL-MCNC: 65 MG/DL
HGB BLD-MCNC: 12.9 G/DL
IMM GRANULOCYTES NFR BLD AUTO: 0.3 %
LDLC SERPL CALC-MCNC: 60 MG/DL
LYMPHOCYTES # BLD AUTO: 2.24 K/UL
LYMPHOCYTES NFR BLD AUTO: 31.2 %
MAN DIFF?: NORMAL
MCHC RBC-ENTMCNC: 30.2 PG
MCHC RBC-ENTMCNC: 33 GM/DL
MCV RBC AUTO: 91.6 FL
MONOCYTES # BLD AUTO: 0.54 K/UL
MONOCYTES NFR BLD AUTO: 7.5 %
NEUTROPHILS # BLD AUTO: 3.89 K/UL
NEUTROPHILS NFR BLD AUTO: 54.2 %
NONHDLC SERPL-MCNC: 83 MG/DL
PLATELET # BLD AUTO: 250 K/UL
POTASSIUM SERPL-SCNC: 4 MMOL/L
PROT SERPL-MCNC: 7 G/DL
PSA SERPL-MCNC: 1.22 NG/ML
RBC # BLD: 4.27 M/UL
RBC # FLD: 12 %
SODIUM SERPL-SCNC: 138 MMOL/L
TRIGL SERPL-MCNC: 117 MG/DL
TSH SERPL-ACNC: 2.05 UIU/ML
VIT B12 SERPL-MCNC: 452 PG/ML
WBC # FLD AUTO: 7.18 K/UL

## 2022-01-31 ENCOUNTER — RESULT REVIEW (OUTPATIENT)
Age: 62
End: 2022-01-31

## 2022-01-31 ENCOUNTER — APPOINTMENT (OUTPATIENT)
Dept: CT IMAGING | Facility: HOSPITAL | Age: 62
End: 2022-01-31

## 2022-01-31 ENCOUNTER — OUTPATIENT (OUTPATIENT)
Dept: OUTPATIENT SERVICES | Facility: HOSPITAL | Age: 62
LOS: 1 days | End: 2022-01-31
Payer: COMMERCIAL

## 2022-01-31 DIAGNOSIS — Z87.81 PERSONAL HISTORY OF (HEALED) TRAUMATIC FRACTURE: Chronic | ICD-10-CM

## 2022-01-31 PROCEDURE — 71250 CT THORAX DX C-: CPT

## 2022-01-31 PROCEDURE — 71250 CT THORAX DX C-: CPT | Mod: 26

## 2023-02-03 NOTE — ED ADULT NURSE NOTE - CHIEF COMPLAINT QUOTE
Hpi Title: Evaluation of Skin Lesions Patient sent in by Dr. Ramírez s/o stress test. Patient c/o chest pain and nausea.

## 2023-02-14 ENCOUNTER — APPOINTMENT (OUTPATIENT)
Dept: HEART AND VASCULAR | Facility: CLINIC | Age: 63
End: 2023-02-14
Payer: COMMERCIAL

## 2023-02-14 VITALS
OXYGEN SATURATION: 98 % | WEIGHT: 185 LBS | BODY MASS INDEX: 25.06 KG/M2 | HEIGHT: 72 IN | HEART RATE: 71 BPM | DIASTOLIC BLOOD PRESSURE: 74 MMHG | SYSTOLIC BLOOD PRESSURE: 102 MMHG | TEMPERATURE: 97.7 F

## 2023-02-14 DIAGNOSIS — D64.9 ANEMIA, UNSPECIFIED: ICD-10-CM

## 2023-02-14 PROCEDURE — 36415 COLL VENOUS BLD VENIPUNCTURE: CPT

## 2023-02-14 PROCEDURE — 99214 OFFICE O/P EST MOD 30 MIN: CPT | Mod: 25

## 2023-02-14 PROCEDURE — 93000 ELECTROCARDIOGRAM COMPLETE: CPT

## 2023-02-16 PROBLEM — D64.9 ANEMIA, UNSPECIFIED TYPE: Status: ACTIVE | Noted: 2023-02-16

## 2023-02-16 LAB
ESTIMATED AVERAGE GLUCOSE: 114 MG/DL
HBA1C MFR BLD HPLC: 5.6 %

## 2023-02-16 NOTE — HISTORY OF PRESENT ILLNESS
[FreeTextEntry1] : pt with hx of 30% pvcs on holter pt wants to start rigourous exercise program on cta 2017 minimal cad calcium score of 5

## 2023-02-16 NOTE — ASSESSMENT
[FreeTextEntry1] :  holter placed minimal cad check lipid panel holter p[laced\par cta calcium score 2 with less than 30% plaque in lad prox and mid 2017\par will schedule ett for mynor\par pt mildly anemic will check when pt had last colonscopy but according to pt uptodate

## 2023-02-17 LAB
ALBUMIN SERPL ELPH-MCNC: 4.4 G/DL
ALP BLD-CCNC: 51 U/L
ALT SERPL-CCNC: 31 U/L
ANION GAP SERPL CALC-SCNC: 11 MMOL/L
AST SERPL-CCNC: 20 U/L
BASOPHILS # BLD AUTO: 0.08 K/UL
BASOPHILS NFR BLD AUTO: 1.2 %
BILIRUB SERPL-MCNC: 0.4 MG/DL
BUN SERPL-MCNC: 22 MG/DL
CALCIUM SERPL-MCNC: 9.2 MG/DL
CHLORIDE SERPL-SCNC: 103 MMOL/L
CHOLEST SERPL-MCNC: 151 MG/DL
CK SERPL-CCNC: 72 U/L
CO2 SERPL-SCNC: 24 MMOL/L
CREAT SERPL-MCNC: 1.01 MG/DL
EGFR: 84 ML/MIN/1.73M2
EOSINOPHIL # BLD AUTO: 0.57 K/UL
EOSINOPHIL NFR BLD AUTO: 8.5 %
FERRITIN SERPL-MCNC: 129 NG/ML
GLUCOSE SERPL-MCNC: 99 MG/DL
HCT VFR BLD CALC: 37.1 %
HDLC SERPL-MCNC: 68 MG/DL
HGB BLD-MCNC: 12.6 G/DL
IMM GRANULOCYTES NFR BLD AUTO: 0.4 %
LDLC SERPL CALC-MCNC: 63 MG/DL
LYMPHOCYTES # BLD AUTO: 1.89 K/UL
LYMPHOCYTES NFR BLD AUTO: 28.3 %
MAN DIFF?: NORMAL
MCHC RBC-ENTMCNC: 31.5 PG
MCHC RBC-ENTMCNC: 34 GM/DL
MCV RBC AUTO: 92.8 FL
MONOCYTES # BLD AUTO: 0.57 K/UL
MONOCYTES NFR BLD AUTO: 8.5 %
NEUTROPHILS # BLD AUTO: 3.55 K/UL
NEUTROPHILS NFR BLD AUTO: 53.1 %
NONHDLC SERPL-MCNC: 83 MG/DL
PLATELET # BLD AUTO: 246 K/UL
POTASSIUM SERPL-SCNC: 4.5 MMOL/L
PROT SERPL-MCNC: 6.6 G/DL
PSA FREE FLD-MCNC: 39 %
PSA FREE SERPL-MCNC: 0.48 NG/ML
PSA SERPL-MCNC: 1.22 NG/ML
RBC # BLD: 4 M/UL
RBC # FLD: 12.5 %
SODIUM SERPL-SCNC: 138 MMOL/L
TRIGL SERPL-MCNC: 99 MG/DL
TSH SERPL-ACNC: 2.57 UIU/ML
VIT B12 SERPL-MCNC: 376 PG/ML
WBC # FLD AUTO: 6.69 K/UL

## 2023-03-02 ENCOUNTER — APPOINTMENT (OUTPATIENT)
Dept: HEART AND VASCULAR | Facility: CLINIC | Age: 63
End: 2023-03-02
Payer: COMMERCIAL

## 2023-03-02 VITALS
HEART RATE: 74 BPM | DIASTOLIC BLOOD PRESSURE: 65 MMHG | HEIGHT: 72 IN | WEIGHT: 185 LBS | SYSTOLIC BLOOD PRESSURE: 101 MMHG | BODY MASS INDEX: 25.06 KG/M2

## 2023-03-02 PROCEDURE — 99214 OFFICE O/P EST MOD 30 MIN: CPT | Mod: 25

## 2023-03-02 PROCEDURE — 93306 TTE W/DOPPLER COMPLETE: CPT

## 2023-03-02 PROCEDURE — 36415 COLL VENOUS BLD VENIPUNCTURE: CPT

## 2023-03-02 PROCEDURE — 93015 CV STRESS TEST SUPVJ I&R: CPT

## 2023-03-04 LAB
ALBUMIN SERPL ELPH-MCNC: 4.6 G/DL
ALP BLD-CCNC: 52 U/L
ALT SERPL-CCNC: 24 U/L
ANION GAP SERPL CALC-SCNC: 15 MMOL/L
AST SERPL-CCNC: 18 U/L
BASOPHILS # BLD AUTO: 0.06 K/UL
BASOPHILS NFR BLD AUTO: 0.9 %
BILIRUB SERPL-MCNC: 0.7 MG/DL
BUN SERPL-MCNC: 19 MG/DL
CALCIUM SERPL-MCNC: 9.4 MG/DL
CHLORIDE SERPL-SCNC: 101 MMOL/L
CK SERPL-CCNC: 82 U/L
CO2 SERPL-SCNC: 22 MMOL/L
CREAT SERPL-MCNC: 0.97 MG/DL
EGFR: 88 ML/MIN/1.73M2
EOSINOPHIL # BLD AUTO: 0.28 K/UL
EOSINOPHIL NFR BLD AUTO: 4.3 %
FERRITIN SERPL-MCNC: 184 NG/ML
GLUCOSE SERPL-MCNC: 97 MG/DL
HAPTOGLOB SERPL-MCNC: 64 MG/DL
HCT VFR BLD CALC: 37.6 %
HGB BLD-MCNC: 12.9 G/DL
IMM GRANULOCYTES NFR BLD AUTO: 0.3 %
IRON SATN MFR SERPL: 30 %
IRON SERPL-MCNC: 95 UG/DL
LYMPHOCYTES # BLD AUTO: 1.91 K/UL
LYMPHOCYTES NFR BLD AUTO: 29.4 %
MAN DIFF?: NORMAL
MCHC RBC-ENTMCNC: 31.5 PG
MCHC RBC-ENTMCNC: 34.3 GM/DL
MCV RBC AUTO: 91.7 FL
MONOCYTES # BLD AUTO: 0.46 K/UL
MONOCYTES NFR BLD AUTO: 7.1 %
NEUTROPHILS # BLD AUTO: 3.76 K/UL
NEUTROPHILS NFR BLD AUTO: 58 %
PLATELET # BLD AUTO: 229 K/UL
POTASSIUM SERPL-SCNC: 4.3 MMOL/L
PROT SERPL-MCNC: 6.8 G/DL
RBC # BLD: 4.1 M/UL
RBC # FLD: 12.3 %
SODIUM SERPL-SCNC: 137 MMOL/L
TIBC SERPL-MCNC: 322 UG/DL
UIBC SERPL-MCNC: 226 UG/DL
WBC # FLD AUTO: 6.49 K/UL

## 2023-03-04 NOTE — REASON FOR VISIT
[FreeTextEntry1] : Patient has a history of frequent PVCs up to 30% on Holter monitor patient was evaluated with CTA that showed mild coronary disease MRI that showed no sign of AR VT patient was seen by EPS Dr. Boogie who discussed possible ablation patient has not been symptomatic patient has passed out twice when fracturing a bone on Holter monitoring there have been no runs of ventricular tachycardiia\par Patient has mild aortic root dilatation of 4.3\par \par Patient has mild anemia last colonoscopy was 3 or 4 years ago patient is not iron deficient

## 2023-03-04 NOTE — ASSESSMENT
[FreeTextEntry1] : Patient had a maximal stress test today with no ischemic changes PVCs suppressed with exercise there was no runs of ventricular tachycardia\par \par Patient's echocardiogram did not show any structural problems\par \par Patient is mildly anemic has had recent within the last 5 years colonoscopy patient is not iron deficient patient is referred to hematology to Dr. Arnoldo Lemus

## 2023-03-14 NOTE — PROVIDER CONTACT NOTE (OTHER) - DATE AND TIME:
The skin at the access site was anesthetized. Using a micropuncture needle with the Modified Seldinger technique and ultrasound (Cloudamize) the right femoral vein was succesfully accessed in an antegrade fashion over the guidewire, under fluoroscopic guidance using a INTRODUCER SHTH 8FR 50CM 11CM GW PRELUDE PRO .035IN BLUE. Ultrasound found the vessel was patent. A permanent recording was saved. 18-Jun-2019 10:03

## 2023-12-06 RX ORDER — AMOXICILLIN AND CLAVULANATE POTASSIUM 875; 125 MG/1; MG/1
875-125 TABLET, COATED ORAL TWICE DAILY
Qty: 14 | Refills: 0 | Status: ACTIVE | COMMUNITY
Start: 2023-12-06 | End: 1900-01-01

## 2024-02-28 ENCOUNTER — RX RENEWAL (OUTPATIENT)
Age: 64
End: 2024-02-28

## 2024-02-28 RX ORDER — METOPROLOL SUCCINATE 25 MG/1
25 TABLET, EXTENDED RELEASE ORAL
Qty: 180 | Refills: 0 | Status: ACTIVE | COMMUNITY
Start: 2019-06-25 | End: 1900-01-01

## 2024-04-18 ENCOUNTER — APPOINTMENT (OUTPATIENT)
Dept: HEART AND VASCULAR | Facility: CLINIC | Age: 64
End: 2024-04-18
Payer: COMMERCIAL

## 2024-04-18 DIAGNOSIS — I25.10 ATHEROSCLEROTIC HEART DISEASE OF NATIVE CORONARY ARTERY W/OUT ANGINA PECTORIS: ICD-10-CM

## 2024-04-18 DIAGNOSIS — I49.3 VENTRICULAR PREMATURE DEPOLARIZATION: ICD-10-CM

## 2024-04-18 PROCEDURE — 99214 OFFICE O/P EST MOD 30 MIN: CPT | Mod: 25

## 2024-04-18 PROCEDURE — 93015 CV STRESS TEST SUPVJ I&R: CPT

## 2024-04-18 PROCEDURE — 93306 TTE W/DOPPLER COMPLETE: CPT

## 2024-04-18 PROCEDURE — 36415 COLL VENOUS BLD VENIPUNCTURE: CPT

## 2024-04-19 LAB
ALBUMIN SERPL ELPH-MCNC: 4.8 G/DL
ALP BLD-CCNC: 50 U/L
ALT SERPL-CCNC: 25 U/L
ANION GAP SERPL CALC-SCNC: 17 MMOL/L
AST SERPL-CCNC: 26 U/L
BASOPHILS # BLD AUTO: 0.08 K/UL
BASOPHILS NFR BLD AUTO: 1.2 %
BILIRUB SERPL-MCNC: 0.4 MG/DL
BUN SERPL-MCNC: 14 MG/DL
CALCIUM SERPL-MCNC: 9.4 MG/DL
CHLORIDE SERPL-SCNC: 103 MMOL/L
CHOLEST SERPL-MCNC: 180 MG/DL
CK SERPL-CCNC: 149 U/L
CO2 SERPL-SCNC: 23 MMOL/L
CREAT SERPL-MCNC: 0.99 MG/DL
EGFR: 86 ML/MIN/1.73M2
EOSINOPHIL # BLD AUTO: 0.35 K/UL
EOSINOPHIL NFR BLD AUTO: 5.2 %
ESTIMATED AVERAGE GLUCOSE: 108 MG/DL
FERRITIN SERPL-MCNC: 173 NG/ML
GLUCOSE SERPL-MCNC: 88 MG/DL
HBA1C MFR BLD HPLC: 5.4 %
HCT VFR BLD CALC: 39.9 %
HDLC SERPL-MCNC: 85 MG/DL
HGB BLD-MCNC: 13 G/DL
IMM GRANULOCYTES NFR BLD AUTO: 0.4 %
IRON SATN MFR SERPL: 30 %
IRON SERPL-MCNC: 104 UG/DL
LDLC SERPL CALC-MCNC: 80 MG/DL
LYMPHOCYTES # BLD AUTO: 2.63 K/UL
LYMPHOCYTES NFR BLD AUTO: 38.8 %
MAN DIFF?: NORMAL
MCHC RBC-ENTMCNC: 30.7 PG
MCHC RBC-ENTMCNC: 32.6 GM/DL
MCV RBC AUTO: 94.1 FL
MONOCYTES # BLD AUTO: 0.63 K/UL
MONOCYTES NFR BLD AUTO: 9.3 %
NEUTROPHILS # BLD AUTO: 3.05 K/UL
NEUTROPHILS NFR BLD AUTO: 45.1 %
NONHDLC SERPL-MCNC: 95 MG/DL
PLATELET # BLD AUTO: 265 K/UL
POTASSIUM SERPL-SCNC: 4.9 MMOL/L
PROT SERPL-MCNC: 7 G/DL
PSA SERPL-MCNC: 1.38 NG/ML
RBC # BLD: 4.24 M/UL
RBC # FLD: 13 %
SODIUM SERPL-SCNC: 142 MMOL/L
TIBC SERPL-MCNC: 343 UG/DL
TRIGL SERPL-MCNC: 86 MG/DL
TSH SERPL-ACNC: 2.19 UIU/ML
UIBC SERPL-MCNC: 239 UG/DL
VIT B12 SERPL-MCNC: 368 PG/ML
WBC # FLD AUTO: 6.77 K/UL

## 2024-04-19 RX ORDER — ROSUVASTATIN CALCIUM 20 MG/1
20 TABLET, FILM COATED ORAL
Qty: 90 | Refills: 3 | Status: ACTIVE | COMMUNITY
Start: 2019-06-25 | End: 1900-01-01

## 2024-04-22 NOTE — ASSESSMENT
[FreeTextEntry1] : Pt. is a 63-year-old M with PMHx of CAD, PVCs and anemia here for follow-up.  pt with no mynor on stress test echocardiogram normal lv fx pt has mild sleepapnea but not while lying on his side  pt with mild non iron def anemia last colonscopy within the last 5 years pt did not see hematology as advised unable to make appt pt was scheduled for colonscopy 7/2023 will reschedule holter monitor placed to asses ectopy at one time had 31% mynor

## 2024-04-22 NOTE — HISTORY OF PRESENT ILLNESS
[FreeTextEntry1] : Since her last visit, pt did not see Dr Lemus for mild anemia pt had a colonscopy within the last 5 years  Activity:   Pt. denies any chest pain, dyspnea, orthopnea, PND, palpitations, lightheadedness, syncope or lower extremity edema.   ================================= Labs/Diagnostics: 2023 K/Creat: 4.3/0.97 A1c: 5.6% TSH: 2.57 Lipid profile: T, TC: 151, HDL: 68, LDL: 63  Echo (2023):  CONCLUSIONS: 1. The left ventricular systolic function is normal with an ejection fraction visually estimated at 60 to 65 %. There is normal left ventricular diastolic function. 2. The left atrium is normal in size. 3. Normal aortic valve, without any evidence of aortic stenosis or regurgitation. 4. Normal mitral valve structure and function. No mitral stenosis or significant regurgitation. 5. Tricuspid valve is structurally normal with no stenosis or significant regurgitation. 6. The right atrial is normal in size. 7. Normal right ventricular size. 8. No pericardial effusion seen.  Stress EKG (2023): 10:01 mins exercise; 13.3 METS; no ischemic ST EKG changes.

## 2024-08-29 NOTE — ED PROVIDER NOTE - CROS ED ROS STATEMENT
[FreeTextEntry1] : 15 minutes spent in cigarette smoking cessation counseling. Educated patient on deleterious effects and all potential consequences of cigarette smoking, such as COPD, lung cancer, etc. Counseled patient on various smoking cessation techniques, such as nicotine patch, Zyban, acupuncture, etc.  Patient agreed to attempt cigarette smoking cessation.  Will follow up on cigarette smoking cessation on next office visit. Get low-dose lung cancer screening chest CT scan for his active cigarette smoker. Start Trelegy 100/62.5/25 mcg Ellipta, 1 puff daily for COPD. Return for pulmonary follow-up in 3 weeks. Cardiology follow-up with Dr. Wagoner. 
[FreeTextEntry1] : 15 minutes spent in cigarette smoking cessation counseling. Educated patient on deleterious effects and all potential consequences of cigarette smoking, such as COPD, lung cancer, etc. Counseled patient on various smoking cessation techniques, such as nicotine patch, Zyban, acupuncture, etc.  Patient agreed to attempt cigarette smoking cessation.  Will follow up on cigarette smoking cessation on next office visit. Get low-dose lung cancer screening chest CT scan for his active cigarette smoker. Start Trelegy 100/62.5/25 mcg Ellipta, 1 puff daily for COPD. Return for pulmonary follow-up in 3 weeks. Cardiology follow-up with Dr. Wagoner. 
all other ROS negative except as per HPI

## 2024-12-17 ENCOUNTER — RX RENEWAL (OUTPATIENT)
Age: 64
End: 2024-12-17

## 2025-01-09 ENCOUNTER — APPOINTMENT (OUTPATIENT)
Dept: HEART AND VASCULAR | Facility: CLINIC | Age: 65
End: 2025-01-09
Payer: COMMERCIAL

## 2025-01-09 ENCOUNTER — NON-APPOINTMENT (OUTPATIENT)
Age: 65
End: 2025-01-09

## 2025-01-09 ENCOUNTER — OUTPATIENT (OUTPATIENT)
Dept: OUTPATIENT SERVICES | Facility: HOSPITAL | Age: 65
LOS: 1 days | End: 2025-01-09
Payer: COMMERCIAL

## 2025-01-09 VITALS
OXYGEN SATURATION: 97 % | DIASTOLIC BLOOD PRESSURE: 80 MMHG | HEIGHT: 72 IN | WEIGHT: 185 LBS | SYSTOLIC BLOOD PRESSURE: 120 MMHG | HEART RATE: 55 BPM | BODY MASS INDEX: 25.06 KG/M2

## 2025-01-09 DIAGNOSIS — I25.10 ATHEROSCLEROTIC HEART DISEASE OF NATIVE CORONARY ARTERY W/OUT ANGINA PECTORIS: ICD-10-CM

## 2025-01-09 DIAGNOSIS — I49.3 VENTRICULAR PREMATURE DEPOLARIZATION: ICD-10-CM

## 2025-01-09 DIAGNOSIS — R05.3 CHRONIC COUGH: ICD-10-CM

## 2025-01-09 DIAGNOSIS — Z87.81 PERSONAL HISTORY OF (HEALED) TRAUMATIC FRACTURE: Chronic | ICD-10-CM

## 2025-01-09 DIAGNOSIS — D64.9 ANEMIA, UNSPECIFIED: ICD-10-CM

## 2025-01-09 PROCEDURE — 71046 X-RAY EXAM CHEST 2 VIEWS: CPT

## 2025-01-09 PROCEDURE — 71046 X-RAY EXAM CHEST 2 VIEWS: CPT | Mod: 26

## 2025-01-09 PROCEDURE — 36415 COLL VENOUS BLD VENIPUNCTURE: CPT

## 2025-01-09 PROCEDURE — G2211 COMPLEX E/M VISIT ADD ON: CPT | Mod: NC

## 2025-01-09 PROCEDURE — 99214 OFFICE O/P EST MOD 30 MIN: CPT

## 2025-01-09 RX ORDER — AZITHROMYCIN 250 MG/1
250 TABLET, FILM COATED ORAL
Qty: 1 | Refills: 0 | Status: ACTIVE | COMMUNITY
Start: 2025-01-09 | End: 1900-01-01

## 2025-01-12 LAB
ALBUMIN SERPL ELPH-MCNC: 4.6 G/DL
ALP BLD-CCNC: 69 U/L
ALT SERPL-CCNC: 42 U/L
ANION GAP SERPL CALC-SCNC: 14 MMOL/L
AST SERPL-CCNC: 25 U/L
BASOPHILS # BLD AUTO: 0.03 K/UL
BASOPHILS NFR BLD AUTO: 0.5 %
BILIRUB SERPL-MCNC: 0.5 MG/DL
BUN SERPL-MCNC: 14 MG/DL
CALCIUM SERPL-MCNC: 9.7 MG/DL
CHLORIDE SERPL-SCNC: 101 MMOL/L
CHOLEST SERPL-MCNC: 129 MG/DL
CK SERPL-CCNC: 74 U/L
CO2 SERPL-SCNC: 26 MMOL/L
CREAT SERPL-MCNC: 0.79 MG/DL
EGFR: 99 ML/MIN/1.73M2
EOSINOPHIL # BLD AUTO: 0.17 K/UL
EOSINOPHIL NFR BLD AUTO: 2.8 %
ESTIMATED AVERAGE GLUCOSE: 123 MG/DL
FERRITIN SERPL-MCNC: 258 NG/ML
GLUCOSE SERPL-MCNC: 95 MG/DL
HBA1C MFR BLD HPLC: 5.9 %
HCT VFR BLD CALC: 38.1 %
HDLC SERPL-MCNC: 59 MG/DL
HGB BLD-MCNC: 12.8 G/DL
IMM GRANULOCYTES NFR BLD AUTO: 0.5 %
LDLC SERPL CALC-MCNC: 55 MG/DL
LYMPHOCYTES # BLD AUTO: 2.3 K/UL
LYMPHOCYTES NFR BLD AUTO: 37.6 %
MAN DIFF?: NORMAL
MCHC RBC-ENTMCNC: 30.3 PG
MCHC RBC-ENTMCNC: 33.6 G/DL
MCV RBC AUTO: 90.3 FL
MONOCYTES # BLD AUTO: 0.55 K/UL
MONOCYTES NFR BLD AUTO: 9 %
NEUTROPHILS # BLD AUTO: 3.04 K/UL
NEUTROPHILS NFR BLD AUTO: 49.6 %
NONHDLC SERPL-MCNC: 70 MG/DL
PLATELET # BLD AUTO: 282 K/UL
POTASSIUM SERPL-SCNC: 4.5 MMOL/L
PROT SERPL-MCNC: 7.5 G/DL
RBC # BLD: 4.22 M/UL
RBC # FLD: 12 %
SODIUM SERPL-SCNC: 141 MMOL/L
TRIGL SERPL-MCNC: 76 MG/DL
TSH SERPL-ACNC: 2.58 UIU/ML
VIT B12 SERPL-MCNC: 541 PG/ML
WBC # FLD AUTO: 6.12 K/UL

## 2025-03-12 ENCOUNTER — RX RENEWAL (OUTPATIENT)
Age: 65
End: 2025-03-12

## 2025-06-15 NOTE — PATIENT PROFILE ADULT - FALLEN IN THE PAST
The skin of the chest and neck was prepped and draped in the usual sterile manner but not clipped. (If not otherwise specified, skin prep was bilateral.) no

## 2025-06-16 ENCOUNTER — RX RENEWAL (OUTPATIENT)
Age: 65
End: 2025-06-16

## 2025-09-16 ENCOUNTER — RX RENEWAL (OUTPATIENT)
Age: 65
End: 2025-09-16